# Patient Record
Sex: MALE | Race: WHITE
[De-identification: names, ages, dates, MRNs, and addresses within clinical notes are randomized per-mention and may not be internally consistent; named-entity substitution may affect disease eponyms.]

---

## 2018-03-06 ENCOUNTER — HOSPITAL ENCOUNTER (OUTPATIENT)
Dept: HOSPITAL 96 - M.CRD | Age: 73
End: 2018-03-06
Attending: NURSE PRACTITIONER
Payer: COMMERCIAL

## 2018-03-06 DIAGNOSIS — R94.31: Primary | ICD-10-CM

## 2018-03-07 NOTE — TST
[place] [address]
Phone:  [phone]                     TREADMILL STRESS TEST         
_______________________________________________________________________________
 
Name:         SEBLE NIELSON              Room:                     REG CLI
M.R.#:    [id number]     Account #:     [id number]  
Admission:    03/06/18    Attend Phys:   Miriam Cherry
Discharge:                Date of Birth: [birth year]  
Date of Service: 03/06/18 1626  Report #:      [id number]
        [id number]     
_______________________________________________________________________________
THIS REPORT FOR:  //name//                      
 
CC: Roman Sterling NP
 
REFERRING DOCTOR:  Dr. Roman Harman.
 
PROCEDURE:  Standard Gilmar protocol exercise stress test.
 
INDICATION:  Abnormal EKG.
 
CARDIAC RISK FACTORS:  Age greater than 45, hyperlipidemia, and hypertension.
 
CARDIAC MEDICATIONS:  Lisinopril, simvastatin, and diltiazem.
 
The patient exercised per standard Gilmar protocol for a total of 9 minutes and
16 seconds.  The patient achieved 86% of the maximum predicted heart rate and an
energy expenditure equivalent to 10.55 METS.
 
The baseline blood pressure was 164/78 mmHg with a resting pulse rate of 70
beats per minute.  At peak exercise, the blood pressure was 233/80 mmHg with a
peak stress heart rate of 130 beats per minute.  In recovery, the blood pressure
was 182/82 mmHg with a recovery heart rate of 85 beats per minute.
 
The patient exhibited a hypertensive response to exercise.
 
The patient had no significant symptoms _____ standard Gilmar protocol exercise. 
Exercise was discontinued due to dyspnea.
 
The baseline 12-lead electrocardiogram shows sinus rhythm without significant ST
or T-wave abnormality.  EKGs obtained during and post-exercise shows sinus
rhythm and sinus tachycardia with no significant ST or T-wave changes when
compared to baseline EKG.  There were no stress induced arrhythmias.  The
patient exhibited good exercise tolerance.
 
IMPRESSIONS:
1.  Clinical response, nonischemic.
2.  EKG response, nonischemic.
 
CONCLUSION:
1.  This standard Gilmar protocol exercise stress test shows no evidence to
 
 
[place] [address]
Phone:  [phone]                     TREADMILL STRESS TEST         
_______________________________________________________________________________
 
Name:         SEBLE NIELSON              Room:                     REG CLI
M.R.#:    [id number]     Account #:     [id number]  
Admission:    03/06/18    Attend Phys:   Miriam Cherry
Discharge:                Date of Birth: [birth year]  
Date of Service: 03/06/18 1626  Report #:      [id number]
        [id number]     
_______________________________________________________________________________
suggest stress-induced ischemia.
2.  The patient exhibited good exercise tolerance.
 
 
 
 
 
 
 
 
 
 
 
 
 
 
 
 
 
 
 
 
 
 
 
 
 
 
 
 
 
 
 
 
 
 
 
 
 
 
 
 
 
 
  <ELECTRONICALLY SIGNED>
                                           By: Walter Montes MD, FACC   
  03/07/18     1104
D: 03/06/18 1626   _____________________________________
T: 03/06/18 1911   Walter Montes MD, FACC     /nt